# Patient Record
Sex: FEMALE | URBAN - METROPOLITAN AREA
[De-identification: names, ages, dates, MRNs, and addresses within clinical notes are randomized per-mention and may not be internally consistent; named-entity substitution may affect disease eponyms.]

---

## 2021-03-18 ENCOUNTER — OFFICE VISIT (OUTPATIENT)
Dept: URBAN - METROPOLITAN AREA CLINIC 98 | Facility: CLINIC | Age: 26
End: 2021-03-18
Payer: COMMERCIAL

## 2021-03-18 VITALS
WEIGHT: 141.6 LBS | HEIGHT: 65 IN | TEMPERATURE: 96.4 F | DIASTOLIC BLOOD PRESSURE: 83 MMHG | SYSTOLIC BLOOD PRESSURE: 125 MMHG | BODY MASS INDEX: 23.59 KG/M2 | HEART RATE: 73 BPM

## 2021-03-18 DIAGNOSIS — R11.0 NAUSEA: ICD-10-CM

## 2021-03-18 DIAGNOSIS — R19.4 CHANGE IN BOWEL HABITS: ICD-10-CM

## 2021-03-18 DIAGNOSIS — K62.5 RECTAL BLEEDING: ICD-10-CM

## 2021-03-18 DIAGNOSIS — R10.30 LOWER ABDOMINAL PAIN: ICD-10-CM

## 2021-03-18 PROCEDURE — 99204 OFFICE O/P NEW MOD 45 MIN: CPT | Performed by: INTERNAL MEDICINE

## 2021-03-18 RX ORDER — DICYCLOMINE HYDROCHLORIDE 20 MG/1
1 TABLET TABLET ORAL
Qty: 30 | Refills: 0 | OUTPATIENT
Start: 2021-03-18 | End: 2021-03-28

## 2021-03-18 RX ORDER — CALCIUM CARB/VITAMIN D3/VIT K1 500-500-40
AS DIRECTED TABLET,CHEWABLE ORAL
Status: ACTIVE | COMMUNITY

## 2021-03-18 RX ORDER — SODIUM, POTASSIUM,MAG SULFATES 17.5-3.13G
354ML SOLUTION, RECONSTITUTED, ORAL ORAL
Qty: 354 MILLILITER | Refills: 0 | OUTPATIENT
Start: 2021-03-18 | End: 2021-03-19

## 2021-03-18 RX ORDER — PHENTERMINE HYDROCHLORIDE 15 MG/1
1 CAPSULE CAPSULE ORAL ONCE A DAY
Status: ACTIVE | COMMUNITY

## 2021-03-18 NOTE — EXAM-FUNCTIONAL ASSESSMENT
Constitutional: well-developed, normal communication ability.   Eyes: Conjunctivae and eyelids appear normal, no scleral icterus. Respiratory: symmetric expansion of chest wall, normal work of breathing   Gastrointestinal:  normoactive bowel sounds, soft, no tenderness, no rebound tenderness, no shifting dullness, no organomegaly.   Musculoskeletal: normal gait and station   Skin: no jaundice   Neurologic: Oriented to person, place, time. Short term memory intact.    Psychiatric: Normal mood and appropriate affect.

## 2021-03-18 NOTE — HPI-TODAY'S VISIT:
Ms. Austin is a 26 yo F presenting with digestive problems. She has been having abdominal pain for many years. She moved to [place] 6 months ago for [facility] program.   She will wake up in the middle of the night with stabbing pain. She tries to have a BM and after a few hours she will have a BM. The stool is usually solid and over time it becomes loose and is small volume. The pain makes her nauseated. The pain eventually improves after she has bowel movements. She has tried miralax which did not change her symptoms. She is unsure if there is a relation to her diet or stress.   She has had abdominal imaging in the past that she says was normal.   She gets blood in her stool intermittently on toilet paper for the last 1 year. It is unpredictable.   She has no unintentional weight loss.

## 2021-03-29 ENCOUNTER — ERX REFILL RESPONSE (OUTPATIENT)
Dept: URBAN - METROPOLITAN AREA CLINIC 98 | Facility: CLINIC | Age: 26
End: 2021-03-29

## 2021-03-29 RX ORDER — DICYCLOMINE HYDROCHLORIDE 20 MG/1
1 TABLET TABLET ORAL
Qty: 30 | Refills: 0

## 2021-04-06 ENCOUNTER — OFFICE VISIT (OUTPATIENT)
Dept: URBAN - METROPOLITAN AREA SURGERY CENTER 18 | Facility: SURGERY CENTER | Age: 26
End: 2021-04-06
Payer: COMMERCIAL

## 2021-04-06 ENCOUNTER — CLAIMS CREATED FROM THE CLAIM WINDOW (OUTPATIENT)
Dept: URBAN - METROPOLITAN AREA CLINIC 4 | Facility: CLINIC | Age: 26
End: 2021-04-06
Payer: COMMERCIAL

## 2021-04-06 DIAGNOSIS — K29.60 OTHER GASTRITIS WITHOUT BLEEDING: ICD-10-CM

## 2021-04-06 DIAGNOSIS — K63.89 STENOSIS OF ILEOCECAL VALVE: ICD-10-CM

## 2021-04-06 DIAGNOSIS — K31.89 ISOLATED IDIOPATHIC GRANULOMA OF STOMACH: ICD-10-CM

## 2021-04-06 DIAGNOSIS — R11.0 CHRONIC NAUSEA: ICD-10-CM

## 2021-04-06 DIAGNOSIS — K31.89 ACQUIRED DEFORMITY OF DUODENUM: ICD-10-CM

## 2021-04-06 DIAGNOSIS — K63.89 BACTERIAL OVERGROWTH SYNDROME: ICD-10-CM

## 2021-04-06 DIAGNOSIS — K92.1 BLACK STOOL: ICD-10-CM

## 2021-04-06 PROCEDURE — 88312 SPECIAL STAINS GROUP 1: CPT | Performed by: PATHOLOGY

## 2021-04-06 PROCEDURE — 43239 EGD BIOPSY SINGLE/MULTIPLE: CPT | Performed by: INTERNAL MEDICINE

## 2021-04-06 PROCEDURE — G8907 PT DOC NO EVENTS ON DISCHARG: HCPCS | Performed by: INTERNAL MEDICINE

## 2021-04-06 PROCEDURE — 45380 COLONOSCOPY AND BIOPSY: CPT | Performed by: INTERNAL MEDICINE

## 2021-04-06 PROCEDURE — 88305 TISSUE EXAM BY PATHOLOGIST: CPT | Performed by: PATHOLOGY

## 2021-04-06 RX ORDER — DICYCLOMINE HYDROCHLORIDE 20 MG/1
1 TABLET TABLET ORAL
Qty: 30 | Refills: 0 | Status: ACTIVE | COMMUNITY

## 2021-04-06 RX ORDER — PHENTERMINE HYDROCHLORIDE 15 MG/1
1 CAPSULE CAPSULE ORAL ONCE A DAY
Status: ACTIVE | COMMUNITY

## 2021-04-06 RX ORDER — CALCIUM CARB/VITAMIN D3/VIT K1 500-500-40
AS DIRECTED TABLET,CHEWABLE ORAL
Status: ACTIVE | COMMUNITY

## 2021-04-07 LAB
DEAMIDATED GLIADIN ABS, IGA: 6
H PYLORI BREATH TEST: NEGATIVE
H. PYLORI BREATH COLLECTION: (no result)
IMMUNOGLOBULIN A, QN, SERUM: 221
T-TRANSGLUTAMINASE (TTG) IGA: <2

## 2021-04-29 ENCOUNTER — OFFICE VISIT (OUTPATIENT)
Dept: URBAN - METROPOLITAN AREA CLINIC 98 | Facility: CLINIC | Age: 26
End: 2021-04-29
Payer: COMMERCIAL

## 2021-04-29 VITALS
SYSTOLIC BLOOD PRESSURE: 120 MMHG | WEIGHT: 139.8 LBS | HEART RATE: 80 BPM | BODY MASS INDEX: 23.29 KG/M2 | TEMPERATURE: 97.7 F | DIASTOLIC BLOOD PRESSURE: 80 MMHG | HEIGHT: 65 IN

## 2021-04-29 DIAGNOSIS — K58.2 MIXED IRRITABLE BOWEL SYNDROME: ICD-10-CM

## 2021-04-29 DIAGNOSIS — R10.13 EPIGASTRIC ABDOMINAL PAIN: ICD-10-CM

## 2021-04-29 DIAGNOSIS — K62.5 RECTAL BLEEDING: ICD-10-CM

## 2021-04-29 PROBLEM — 440544005: Status: ACTIVE | Noted: 2021-04-29

## 2021-04-29 PROCEDURE — 99214 OFFICE O/P EST MOD 30 MIN: CPT | Performed by: INTERNAL MEDICINE

## 2021-04-29 RX ORDER — DICYCLOMINE HYDROCHLORIDE 20 MG/1
1 TABLET TABLET ORAL
Qty: 30 | Refills: 0 | Status: ACTIVE | COMMUNITY

## 2021-04-29 RX ORDER — DICYCLOMINE HYDROCHLORIDE 20 MG/1
1 TABLET TABLET ORAL
Qty: 30 | Refills: 0

## 2021-04-29 RX ORDER — HYDROCORTISONE 10 MG/G
1 APPLICATION CREAM TOPICAL TWICE A DAY
Qty: 1 | Refills: 1 | OUTPATIENT
Start: 2021-04-29 | End: 2021-05-13

## 2021-04-29 RX ORDER — PHENTERMINE HYDROCHLORIDE 15 MG/1
1 CAPSULE CAPSULE ORAL ONCE A DAY
Status: ACTIVE | COMMUNITY

## 2021-04-29 RX ORDER — CALCIUM CARB/VITAMIN D3/VIT K1 500-500-40
AS DIRECTED TABLET,CHEWABLE ORAL
Status: ACTIVE | COMMUNITY

## 2021-04-29 NOTE — HPI-TODAY'S VISIT:
Ms. Austin is a a 26 yo F presenting with abdominal pain.   She developed new epigastric pain about 3 days ago. The pain was sharp and 9/10 and radiated to the back. The pain came on suddenly. She took dicyclomine but it did not help. She had never had pain like this before. She had imaging done at [facility] and was told that she was having a problem with her gallbladder. She had a HIDA scan in the hospital. She has had some improvement in her pain and is about 3/10 at this time.   She is still having some pink/red in her stool which happens 1-2 times per day, solid. She has blood every time she has a bowel movement. it is on the toilet paper and it is not mixed into the stool.  I personally reviewed:  4/6/21 EGD: normal. Biopsies throughout 4/6/21 EGD path: benign inflammation. No H pylori 4/6/21 colonoscopy: patchy erythema throughout. TI normal. Biospies throughout 4/6/21: colonoscopy path: normal

## 2021-04-29 NOTE — EXAM-FUNCTIONAL ASSESSMENT
Constitutional: well-developed, normal communication ability.   Eyes: Conjunctivae and eyelids appear normal, no scleral icterus. Respiratory: symmetric expansion of chest wall, normal work of breathing   Gastrointestinal:  normoactive bowel sounds, soft, epigastric and LUQ tenderness, no rebound tenderness, no shifting dullness, no organomegaly.   Musculoskeletal: normal gait and station   Skin: no jaundice   Neurologic: Oriented to person, place, time. Short term memory intact.    Psychiatric: Normal mood and appropriate affect.

## 2021-05-12 ENCOUNTER — OFFICE VISIT (OUTPATIENT)
Dept: URBAN - METROPOLITAN AREA CLINIC 98 | Facility: CLINIC | Age: 26
End: 2021-05-12

## 2021-05-12 RX ORDER — HYDROCORTISONE 10 MG/G
1 APPLICATION CREAM TOPICAL TWICE A DAY
Qty: 1 | Refills: 1 | OUTPATIENT

## 2021-05-12 RX ORDER — PHENTERMINE HYDROCHLORIDE 15 MG/1
1 CAPSULE CAPSULE ORAL ONCE A DAY
Status: ACTIVE | COMMUNITY

## 2021-05-12 RX ORDER — HYDROCORTISONE 10 MG/G
1 APPLICATION CREAM TOPICAL TWICE A DAY
Qty: 1 | Refills: 1 | Status: ACTIVE | COMMUNITY
Start: 2021-04-29 | End: 2021-05-13

## 2021-05-12 RX ORDER — DICYCLOMINE HYDROCHLORIDE 20 MG/1
1 TABLET TABLET ORAL
Qty: 30 | Refills: 0 | Status: ACTIVE | COMMUNITY

## 2021-05-12 RX ORDER — DICYCLOMINE HYDROCHLORIDE 20 MG/1
1 TABLET TABLET ORAL
Qty: 30 | Refills: 0

## 2021-05-12 RX ORDER — CALCIUM CARB/VITAMIN D3/VIT K1 500-500-40
AS DIRECTED TABLET,CHEWABLE ORAL
Status: ACTIVE | COMMUNITY

## 2021-05-12 NOTE — HPI-TODAY'S VISIT:
//Epigastric pain:   //IBS-mixed:   //Fatty liver:   I personally reviewed:  4/27/21 external [place] notes 4/27/21 CBC: WBC count 12.1, H/H 13.3/40.4 4/27/21 CMP: glucose 142, AST 36, ALT 25, alk phos 63, t bili 0.4 4/27/21 HIDA: normal gallbladder visualization, slight delay in bowel visualization 4/27/21 CT A/P: gallbladder mildly distended, minimal intrahepatic biliary dilation 4/27/21 U/S: steatosis, top normal CBD

## 2023-09-18 ENCOUNTER — TELEPHONE ENCOUNTER (OUTPATIENT)
Dept: URBAN - METROPOLITAN AREA CLINIC 98 | Facility: CLINIC | Age: 28
End: 2023-09-18

## 2023-09-18 ENCOUNTER — OFFICE VISIT (OUTPATIENT)
Dept: URBAN - METROPOLITAN AREA CLINIC 98 | Facility: CLINIC | Age: 28
End: 2023-09-18

## 2023-09-18 RX ORDER — DICYCLOMINE HYDROCHLORIDE 20 MG/1
1 TABLET TABLET ORAL THREE TIMES A DAY
Qty: 30 TABLET | Refills: 0 | OUTPATIENT
Start: 2023-09-18 | End: 2023-09-28

## 2023-09-18 RX ORDER — PHENTERMINE HYDROCHLORIDE 15 MG/1
1 CAPSULE CAPSULE ORAL ONCE A DAY
Status: ACTIVE | COMMUNITY

## 2023-09-18 RX ORDER — HYDROCORTISONE 10 MG/G
1 APPLICATION CREAM TOPICAL TWICE A DAY
Qty: 1 | Refills: 1 | Status: ACTIVE | COMMUNITY

## 2023-09-18 RX ORDER — CALCIUM CARB/VITAMIN D3/VIT K1 500-500-40
AS DIRECTED TABLET,CHEWABLE ORAL
Status: ACTIVE | COMMUNITY

## 2023-09-18 RX ORDER — DICYCLOMINE HYDROCHLORIDE 20 MG/1
1 TABLET TABLET ORAL
Qty: 30 | Refills: 0 | Status: ACTIVE | COMMUNITY

## 2023-09-18 NOTE — HPI-TODAY'S VISIT:
Ms. Austin is a 29 yo F presenting for followup visit with abdominal pain.   Last visit 4/29/21.   //Epigastric pain:   //Rectal bleeding: Suppositories  //Mixed IBS: Previously on dicyclomine   I personally reviewed:  4/5/21 celiac panel: negative 4/5/21 H pylori testing: negative 4/6/21 EGD: normal. Biopsies throughout 4/6/21 EGD path: benign inflammation. No H pylori 4/6/21 colonoscopy: patchy erythema throughout. TI normal. Biospies throughout 4/6/21: colonoscopy path: normal

## 2023-10-16 ENCOUNTER — TELEPHONE ENCOUNTER (OUTPATIENT)
Dept: URBAN - METROPOLITAN AREA CLINIC 6 | Facility: CLINIC | Age: 28
End: 2023-10-16

## 2023-10-16 ENCOUNTER — DASHBOARD ENCOUNTERS (OUTPATIENT)
Age: 28
End: 2023-10-16

## 2023-10-16 ENCOUNTER — OFFICE VISIT (OUTPATIENT)
Dept: URBAN - METROPOLITAN AREA TELEHEALTH 2 | Facility: TELEHEALTH | Age: 28
End: 2023-10-16
Payer: COMMERCIAL

## 2023-10-16 VITALS — HEIGHT: 65 IN | BODY MASS INDEX: 23.82 KG/M2 | WEIGHT: 143 LBS

## 2023-10-16 DIAGNOSIS — K58.2 MIXED IRRITABLE BOWEL SYNDROME: ICD-10-CM

## 2023-10-16 DIAGNOSIS — K62.5 RECTAL BLEEDING: ICD-10-CM

## 2023-10-16 DIAGNOSIS — R10.13 EPIGASTRIC ABDOMINAL PAIN: ICD-10-CM

## 2023-10-16 DIAGNOSIS — R93.5 ABNORMAL CT OF THE ABDOMEN: ICD-10-CM

## 2023-10-16 PROCEDURE — 99214 OFFICE O/P EST MOD 30 MIN: CPT | Performed by: INTERNAL MEDICINE

## 2023-10-16 RX ORDER — DICYCLOMINE HYDROCHLORIDE 20 MG/1
1 TABLET TABLET ORAL
Qty: 30 | Refills: 0 | Status: ACTIVE | COMMUNITY

## 2023-10-16 RX ORDER — DICYCLOMINE HYDROCHLORIDE 20 MG/1
1 TABLET TABLET ORAL THREE TIMES A DAY
Qty: 42 TABLET | Refills: 0 | OUTPATIENT
Start: 2023-10-16 | End: 2023-10-30

## 2023-10-16 NOTE — HPI-TODAY'S VISIT:
Ms. Austin is a 27 yo F presenting for followup visit with abdominal pain.   Last visit 21.   //Epigastric pain: Still happening randomly-not associated with eating or bowel movements that she is aware of.  Happens once every few weeks. Happens in the middle of the night. Lasts a few hours. Sharp pain/crampy. Dicyclomine greatly improves the pain. Associated with looser stools.  //Rectal bleeding: Suppositories ordered at last visit- worked well. No bleeding in a long time.  //Mixed IBS: Having bowel movments most days of the week, improved for her. Rare loose stools.    I personally reviewed:  21 H/H: 13.3/40.4 21 CMP: normal 21 HIDA: slight delay in visualization of bowel activity, prompt gallbladder visualization 21 CT/AP: mild IH ductal dilation, upper limits of normal CBD 21 celiac panel: negative 21 H pylori testing: negative 21 EGD: normal. Biopsies throughout 21 EGD path: benign inflammation. No H pylori 21 colonoscopy: patchy erythema throughout. TI normal. Biospies throughout 21: colonoscopy path: normal   Patient seen today via telehealth by agreement and consent of patient in light of current COVID-19 pandemic. I used video conferencing during the visit. The patient encounter is appropriate and reasonable under the circumstances given the patient's particular presentation at this time. The patient has been advised of the followin) the potential risks and limitations of this mode of treatment (including but not limited to the absence of in-person examination); 2) the right to refuse telehealth services at any point without affecting the right to future care; 3) the right to receive in-person services, included immediately after this consultation if an urgent need arises; 4) information, including identifiable images or information from this telehealth consult, will only be shared in accordance with HIPPA regulations. Any and all of the patient's and/or patient's family member's questions on this issue have been answered. The patient has verbally consented to be treated via telehealth services. The patient has also been advised to contact this office for worsening conditions or problems, and seek emergency medical treatment and/or call 911 if the patient deems either necessary.   More than half of the face-to-face time used for counseling and coordination of care.  The patient received telehealth services at: home

## 2023-10-16 NOTE — EXAM-FUNCTIONAL ASSESSMENT
Constitutional: well-developed, normal communication ability.   Eyes: Conjunctivae and eyelids appear normal, no scleral icterus.   Nose/nasopharynx, external nose: appear normal, normal appearance of lips.   Neck/thyroid: normal appearance   Chest: No visualized lesions or deformities.   Gastrointestinal: no tenderness on self palpation   Musculoskeletal: no visualized joint erythema or swelling   Skin: no rashes or jaundice   Neurologic: Oriented to person, place, time.    Psychiatric: Normal mood and appropriate affect.

## 2024-06-21 ENCOUNTER — OFFICE VISIT (OUTPATIENT)
Dept: URBAN - METROPOLITAN AREA CLINIC 98 | Facility: CLINIC | Age: 29
End: 2024-06-21
Payer: COMMERCIAL

## 2024-06-21 VITALS
HEART RATE: 70 BPM | HEIGHT: 65 IN | TEMPERATURE: 97.3 F | SYSTOLIC BLOOD PRESSURE: 134 MMHG | WEIGHT: 162 LBS | BODY MASS INDEX: 26.99 KG/M2 | DIASTOLIC BLOOD PRESSURE: 77 MMHG

## 2024-06-21 DIAGNOSIS — K62.5 RECTAL BLEEDING: ICD-10-CM

## 2024-06-21 DIAGNOSIS — K58.1 IRRITABLE BOWEL SYNDROME WITH CONSTIPATION: ICD-10-CM

## 2024-06-21 PROBLEM — 440630006: Status: ACTIVE | Noted: 2024-06-21

## 2024-06-21 PROCEDURE — 99214 OFFICE O/P EST MOD 30 MIN: CPT | Performed by: INTERNAL MEDICINE

## 2024-06-21 RX ORDER — DICYCLOMINE HYDROCHLORIDE 20 MG/1
1 TABLET TABLET ORAL
Qty: 30 | Refills: 0 | Status: ON HOLD | COMMUNITY

## 2024-06-21 NOTE — HPI-TODAY'S VISIT:
Ms. Austin is a 28 yo F presenting for followup visit for rectal bleeding.  Last visit 10/16/23  //Rectal bleeding: started again more recently. Happens with bowel movements. Bowel movements happening twice per week. Has not tried suppositories again. Forgot she did this before. Has been bleeding even when more frequent BMs occur.   //Epigastric pain: This has been improved recently.   //Mixed IBS: Having constipation recently.   I personally reviewed:  4/27/21 H/H: 13.3/40.4 4/27/21 CMP: normal 4/27/21 HIDA: slight delay in visualization of bowel activity, prompt gallbladder visualization 4/27/21 CT/AP: mild IH ductal dilation, upper limits of normal CBD 4/5/21 celiac panel: negative 4/5/21 H pylori testing: negative 4/6/21 EGD: normal. Biopsies throughout 4/6/21 EGD path: benign inflammation. No H pylori 4/6/21 colonoscopy: patchy erythema throughout. TI normal. Biospies throughout 4/6/21: colonoscopy path: normal

## 2024-06-21 NOTE — EXAM-FUNCTIONAL ASSESSMENT
Constitutional: well-developed, normal communication ability.   Eyes: Conjunctivae and eyelids appear normal, no scleral icterus. Respiratory:  symmetric expansion of chest wall, normal work of breathing   Gastrointestinal:  normoactive bowel sounds, soft, no tenderness, no rebound tenderness, no shifting dullness, no organomegaly, Rectal exam-skin tags, no hemorrhoids, no blood in the stool, normal maneuvers. Chaperone refused by patient   Musculoskeletal: normal gait and station, no tenderness present.   Skin: No jaundice   Neurologic: Oriented to person, place, time. Short term memory intact.    Psychiatric: Normal mood and appropriate affect.

## 2024-06-28 ENCOUNTER — WEB ENCOUNTER (OUTPATIENT)
Dept: URBAN - METROPOLITAN AREA CLINIC 98 | Facility: CLINIC | Age: 29
End: 2024-06-28

## 2024-07-05 ENCOUNTER — OFFICE VISIT (OUTPATIENT)
Dept: URBAN - METROPOLITAN AREA CLINIC 98 | Facility: CLINIC | Age: 29
End: 2024-07-05